# Patient Record
Sex: FEMALE
[De-identification: names, ages, dates, MRNs, and addresses within clinical notes are randomized per-mention and may not be internally consistent; named-entity substitution may affect disease eponyms.]

---

## 2019-04-18 ENCOUNTER — TRANSCRIPTION ENCOUNTER (OUTPATIENT)
Age: 84
End: 2019-04-18

## 2019-12-03 ENCOUNTER — APPOINTMENT (OUTPATIENT)
Dept: ORTHOPEDIC SURGERY | Facility: CLINIC | Age: 84
End: 2019-12-03
Payer: MEDICARE

## 2019-12-03 VITALS — BODY MASS INDEX: 21.79 KG/M2 | WEIGHT: 123 LBS | HEIGHT: 63 IN

## 2019-12-03 PROBLEM — Z00.00 ENCOUNTER FOR PREVENTIVE HEALTH EXAMINATION: Status: ACTIVE | Noted: 2019-12-03

## 2019-12-03 PROCEDURE — 73562 X-RAY EXAM OF KNEE 3: CPT | Mod: 50

## 2019-12-03 PROCEDURE — 20611 DRAIN/INJ JOINT/BURSA W/US: CPT | Mod: RT

## 2019-12-03 PROCEDURE — 99204 OFFICE O/P NEW MOD 45 MIN: CPT | Mod: 25

## 2019-12-03 NOTE — PHYSICAL EXAM
[de-identified] : Right knee exam today patient has range of motion of 0-125°. 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 Tibiofemoral articulation is fairly well maintained

## 2019-12-03 NOTE — DISCUSSION/SUMMARY
[de-identified] : Patient will followup in 1-2 weeks now improved repeat evaluation possible second injection.Patient would like to avoid into for on any anti-inflammatory medication.

## 2019-12-03 NOTE — HISTORY OF PRESENT ILLNESS
[de-identified] : PAIN STARTED 2-3 WEEKS AGO - REPORTS NO FALLS OR ACCIDENTS Patient claims the right knee began to become painful she recalls no specific accident or injury. It is painful going up stairs not so much down stairs and getting out of a seated position and the pain is mostly anterior.\par \par SENT FOR NEW XRAYS TODAY

## 2019-12-03 NOTE — PROCEDURE
[de-identified] : Patient was given a cortisone injection to the lateral compartment of the right knee today. This was done and sterile conditions and ultrasound guidance. Patient tolerated the procedure well.

## 2020-07-23 ENCOUNTER — TRANSCRIPTION ENCOUNTER (OUTPATIENT)
Age: 85
End: 2020-07-23

## 2020-12-22 ENCOUNTER — TRANSCRIPTION ENCOUNTER (OUTPATIENT)
Age: 85
End: 2020-12-22

## 2021-03-24 ENCOUNTER — TRANSCRIPTION ENCOUNTER (OUTPATIENT)
Age: 86
End: 2021-03-24

## 2021-10-28 ENCOUNTER — TRANSCRIPTION ENCOUNTER (OUTPATIENT)
Age: 86
End: 2021-10-28

## 2024-12-30 ENCOUNTER — EMERGENCY (EMERGENCY)
Facility: HOSPITAL | Age: 89
LOS: 1 days | Discharge: HOME | End: 2024-12-30
Attending: STUDENT IN AN ORGANIZED HEALTH CARE EDUCATION/TRAINING PROGRAM
Payer: MEDICARE

## 2024-12-30 ENCOUNTER — NON-APPOINTMENT (OUTPATIENT)
Age: 89
End: 2024-12-30

## 2024-12-30 VITALS
TEMPERATURE: 98 F | RESPIRATION RATE: 18 BRPM | WEIGHT: 119.93 LBS | OXYGEN SATURATION: 97 % | DIASTOLIC BLOOD PRESSURE: 82 MMHG | HEIGHT: 64 IN | HEART RATE: 70 BPM | SYSTOLIC BLOOD PRESSURE: 149 MMHG

## 2024-12-30 LAB
ALBUMIN SERPL ELPH-MCNC: 3.8 G/DL — SIGNIFICANT CHANGE UP (ref 3.3–5)
ALP SERPL-CCNC: 72 U/L — SIGNIFICANT CHANGE UP (ref 40–120)
ALT FLD-CCNC: 42 U/L — SIGNIFICANT CHANGE UP (ref 10–45)
ANION GAP SERPL CALC-SCNC: 11 MMOL/L — SIGNIFICANT CHANGE UP (ref 5–17)
APPEARANCE UR: CLEAR — SIGNIFICANT CHANGE UP
APTT BLD: 29.5 SEC — SIGNIFICANT CHANGE UP (ref 24.5–35.6)
AST SERPL-CCNC: 33 U/L — SIGNIFICANT CHANGE UP (ref 10–40)
BACTERIA # UR AUTO: NEGATIVE /HPF — SIGNIFICANT CHANGE UP
BASOPHILS # BLD AUTO: 0.07 K/UL — SIGNIFICANT CHANGE UP (ref 0–0.2)
BASOPHILS NFR BLD AUTO: 1 % — SIGNIFICANT CHANGE UP (ref 0–2)
BILIRUB SERPL-MCNC: 0.2 MG/DL — SIGNIFICANT CHANGE UP (ref 0.2–1.2)
BILIRUB UR-MCNC: NEGATIVE — SIGNIFICANT CHANGE UP
BUN SERPL-MCNC: 22 MG/DL — SIGNIFICANT CHANGE UP (ref 7–23)
CALCIUM SERPL-MCNC: 9.2 MG/DL — SIGNIFICANT CHANGE UP (ref 8.4–10.5)
CAST: 0 /LPF — SIGNIFICANT CHANGE UP (ref 0–4)
CHLORIDE SERPL-SCNC: 108 MMOL/L — SIGNIFICANT CHANGE UP (ref 96–108)
CO2 SERPL-SCNC: 22 MMOL/L — SIGNIFICANT CHANGE UP (ref 22–31)
COLOR SPEC: YELLOW — SIGNIFICANT CHANGE UP
CREAT SERPL-MCNC: 0.81 MG/DL — SIGNIFICANT CHANGE UP (ref 0.5–1.3)
DIFF PNL FLD: NEGATIVE — SIGNIFICANT CHANGE UP
EGFR: 70 ML/MIN/1.73M2 — SIGNIFICANT CHANGE UP
EOSINOPHIL # BLD AUTO: 0.12 K/UL — SIGNIFICANT CHANGE UP (ref 0–0.5)
EOSINOPHIL NFR BLD AUTO: 1.7 % — SIGNIFICANT CHANGE UP (ref 0–6)
FLUAV AG NPH QL: SIGNIFICANT CHANGE UP
FLUBV AG NPH QL: SIGNIFICANT CHANGE UP
GLUCOSE SERPL-MCNC: 88 MG/DL — SIGNIFICANT CHANGE UP (ref 70–99)
GLUCOSE UR QL: NEGATIVE MG/DL — SIGNIFICANT CHANGE UP
HCT VFR BLD CALC: 38 % — SIGNIFICANT CHANGE UP (ref 34.5–45)
HGB BLD-MCNC: 12.2 G/DL — SIGNIFICANT CHANGE UP (ref 11.5–15.5)
IMM GRANULOCYTES NFR BLD AUTO: 0.3 % — SIGNIFICANT CHANGE UP (ref 0–0.9)
INR BLD: 1.01 RATIO — SIGNIFICANT CHANGE UP (ref 0.85–1.16)
KETONES UR-MCNC: NEGATIVE MG/DL — SIGNIFICANT CHANGE UP
LEUKOCYTE ESTERASE UR-ACNC: ABNORMAL
LYMPHOCYTES # BLD AUTO: 1.46 K/UL — SIGNIFICANT CHANGE UP (ref 1–3.3)
LYMPHOCYTES # BLD AUTO: 20.7 % — SIGNIFICANT CHANGE UP (ref 13–44)
MCHC RBC-ENTMCNC: 30.3 PG — SIGNIFICANT CHANGE UP (ref 27–34)
MCHC RBC-ENTMCNC: 32.1 G/DL — SIGNIFICANT CHANGE UP (ref 32–36)
MCV RBC AUTO: 94.3 FL — SIGNIFICANT CHANGE UP (ref 80–100)
MONOCYTES # BLD AUTO: 0.55 K/UL — SIGNIFICANT CHANGE UP (ref 0–0.9)
MONOCYTES NFR BLD AUTO: 7.8 % — SIGNIFICANT CHANGE UP (ref 2–14)
NEUTROPHILS # BLD AUTO: 4.84 K/UL — SIGNIFICANT CHANGE UP (ref 1.8–7.4)
NEUTROPHILS NFR BLD AUTO: 68.5 % — SIGNIFICANT CHANGE UP (ref 43–77)
NITRITE UR-MCNC: NEGATIVE — SIGNIFICANT CHANGE UP
NRBC # BLD: 0 /100 WBCS — SIGNIFICANT CHANGE UP (ref 0–0)
PH UR: 8 — SIGNIFICANT CHANGE UP (ref 5–8)
PLATELET # BLD AUTO: 236 K/UL — SIGNIFICANT CHANGE UP (ref 150–400)
POTASSIUM SERPL-MCNC: 4.3 MMOL/L — SIGNIFICANT CHANGE UP (ref 3.5–5.3)
POTASSIUM SERPL-SCNC: 4.3 MMOL/L — SIGNIFICANT CHANGE UP (ref 3.5–5.3)
PROT SERPL-MCNC: 7.3 G/DL — SIGNIFICANT CHANGE UP (ref 6–8.3)
PROT UR-MCNC: NEGATIVE MG/DL — SIGNIFICANT CHANGE UP
PROTHROM AB SERPL-ACNC: 11.5 SEC — SIGNIFICANT CHANGE UP (ref 9.9–13.4)
RBC # BLD: 4.03 M/UL — SIGNIFICANT CHANGE UP (ref 3.8–5.2)
RBC # FLD: 13.5 % — SIGNIFICANT CHANGE UP (ref 10.3–14.5)
RBC CASTS # UR COMP ASSIST: 1 /HPF — SIGNIFICANT CHANGE UP (ref 0–4)
RSV RNA NPH QL NAA+NON-PROBE: SIGNIFICANT CHANGE UP
SARS-COV-2 RNA SPEC QL NAA+PROBE: SIGNIFICANT CHANGE UP
SODIUM SERPL-SCNC: 141 MMOL/L — SIGNIFICANT CHANGE UP (ref 135–145)
SP GR SPEC: 1.02 — SIGNIFICANT CHANGE UP (ref 1–1.03)
SQUAMOUS # UR AUTO: 1 /HPF — SIGNIFICANT CHANGE UP (ref 0–5)
TROPONIN T, HIGH SENSITIVITY RESULT: 11 NG/L — SIGNIFICANT CHANGE UP (ref 0–51)
UROBILINOGEN FLD QL: 0.2 MG/DL — SIGNIFICANT CHANGE UP (ref 0.2–1)
WBC # BLD: 7.06 K/UL — SIGNIFICANT CHANGE UP (ref 3.8–10.5)
WBC # FLD AUTO: 7.06 K/UL — SIGNIFICANT CHANGE UP (ref 3.8–10.5)
WBC UR QL: 4 /HPF — SIGNIFICANT CHANGE UP (ref 0–5)

## 2024-12-30 PROCEDURE — 87637 SARSCOV2&INF A&B&RSV AMP PRB: CPT

## 2024-12-30 PROCEDURE — 93005 ELECTROCARDIOGRAM TRACING: CPT

## 2024-12-30 PROCEDURE — 85610 PROTHROMBIN TIME: CPT

## 2024-12-30 PROCEDURE — 70498 CT ANGIOGRAPHY NECK: CPT | Mod: MC

## 2024-12-30 PROCEDURE — 84484 ASSAY OF TROPONIN QUANT: CPT

## 2024-12-30 PROCEDURE — 85025 COMPLETE CBC W/AUTO DIFF WBC: CPT

## 2024-12-30 PROCEDURE — 99285 EMERGENCY DEPT VISIT HI MDM: CPT | Mod: FS,GC

## 2024-12-30 PROCEDURE — 36000 PLACE NEEDLE IN VEIN: CPT | Mod: XU

## 2024-12-30 PROCEDURE — 93010 ELECTROCARDIOGRAM REPORT: CPT

## 2024-12-30 PROCEDURE — 99285 EMERGENCY DEPT VISIT HI MDM: CPT | Mod: 25

## 2024-12-30 PROCEDURE — 70450 CT HEAD/BRAIN W/O DYE: CPT | Mod: 26,MC,XU

## 2024-12-30 PROCEDURE — 70496 CT ANGIOGRAPHY HEAD: CPT | Mod: MC

## 2024-12-30 PROCEDURE — 70496 CT ANGIOGRAPHY HEAD: CPT | Mod: 26,MC

## 2024-12-30 PROCEDURE — 70450 CT HEAD/BRAIN W/O DYE: CPT | Mod: MC

## 2024-12-30 PROCEDURE — 85730 THROMBOPLASTIN TIME PARTIAL: CPT

## 2024-12-30 PROCEDURE — 81001 URINALYSIS AUTO W/SCOPE: CPT

## 2024-12-30 PROCEDURE — 80053 COMPREHEN METABOLIC PANEL: CPT

## 2024-12-30 PROCEDURE — 87086 URINE CULTURE/COLONY COUNT: CPT

## 2024-12-30 PROCEDURE — 70498 CT ANGIOGRAPHY NECK: CPT | Mod: 26,MC

## 2024-12-30 RX ORDER — MECLIZINE HCL 12.5 MG
25 TABLET ORAL ONCE
Refills: 0 | Status: COMPLETED | OUTPATIENT
Start: 2024-12-30 | End: 2024-12-30

## 2024-12-30 RX ADMIN — Medication 25 MILLIGRAM(S): at 22:50

## 2024-12-30 NOTE — ED PROVIDER NOTE - NSFOLLOWUPINSTRUCTIONS_ED_ALL_ED_FT
You were seen in the emergency department for dizziness you received a CAT scan which was negative for any intracranial bleeding or signs of stroke.  Your symptoms are likely vertigo, we would like for you to see an ENT for vestibular therapy, and a neurologist for your dizziness.  Please take Meclizine for your dizziness at most four times per day  Please take zofran at most 3 times per day for dizziness at least 6 hours    Vertigo  The outer and inner ear showing the eardrum and ear canal.  Vertigo is the feeling that you or the things around you are moving or spinning when they're not. It's different than feeling dizzy. It can also cause:  Loss of balance.  Trouble standing or walking.  Nausea and vomiting.  This feeling can come and go at any time. It can last from a few seconds to minutes or even hours. It may go away on its own or be treated with medicine.    What are the types of vertigo?  There are two types of vertigo:  Peripheral vertigo happens when parts of your inner ear don't work like they should. This is the more common type.  Central vertigo happens when your brain and spinal cord don't work like they should.  Your health care provider will do tests to find out what kind of vertigo you have. This will help them decide on the right treatment for you.    Follow these instructions at home:  Eating and drinking    Drink enough fluid to keep your pee (urine) pale yellow.  Do not drink alcohol.  Activity    When you get up in the morning, first sit up on the side of the bed. When you feel okay, stand slowly while holding onto something.  Move slowly. Avoid sudden body or head movements.  Avoid certain positions, as told by your provider.  Use a cane if you have trouble standing or walking.  Sit down right away if you feel unsteady.  Place items in your home so they're easy for you to reach without bending or leaning over.  Return to normal activities when you're told. Ask what things are safe for you to do.  General instructions    Take your medicines only as told by your provider.  Contact a health care provider if:  Your medicines don't help or make your vertigo worse.  You get new symptoms.  You have a fever.  You have nausea or vomiting.  Your family or friends spot any changes in how you're acting.  A part of your body goes numb.  You feel tingling and prickling in a part of your body.  You get very bad headaches.  Get help right away if:  You're always dizzy or you faint.  You have a stiff neck.  You have trouble moving or speaking.  Your hands, arms, or legs feel weak.  Your hearing or eyesight changes.  These symptoms may be an emergency. Call 911 right away.  Do not wait to see if the symptoms will go away.  Do not drive yourself to the hospital.  This information is not intended to replace advice given to you by your health care provider. Make sure you discuss any questions you have with your health care provider.

## 2024-12-30 NOTE — ED PROVIDER NOTE - ATTENDING CONTRIBUTION TO CARE
Attending Goldy: I performed a history and physical exam of the patient and discussed their management with the resident/fellow/student. I have reviewed the resident/fellow/student note and agree with the documented findings and plan of care, except as noted. I have personally performed a substantive portion of the visit including all aspects of the medical decision making. My medical decision making and observations are found below. Please refer to any progress notes for updates on clinical course. My notes supersedes the above resident/fellow/student note in case of discrepancy    MDM:  88 y/o F w/ PMH of HTN presenting w/ dizziness. Seen w/ daughter. Reports having intermittent dizziness for the past few months. Described as an off balance sensation when walking. Symptoms would usually resolve after a brief time, however for the past day has had persistent dizziness. Worse w/ change in position. Has not been evaluated for this previously as the symptoms always resolved. Denies any falls or head injury. Denies fevers, chills, headache, blurred vision, chest pain, cough, shortness of breath, abdominal pain, n/v/d/c, urinary symptoms, MSK pain, rash.     Gen: NAD, AOx3, able to make needs known, non-toxic  Head: NCAT  HEENT: EOMI, oral mucosa moist, normal conjunctiva. no nystagmus  Lung: no respiratory distress, CTAB, no wheezes/rhonchi/rales B/L, speaking in full sentences  CV: RRR, no murmurs  Abd: non distended, soft, nontender, no guarding, no CVA tenderness  MSK: no visible deformities  Neuro: Appears non focal. steady gait  Skin: Warm, well perfused  Psych: normal affect     Pt overall well appearing, no acute distress. Suspect likely peripheral vertigo however given age and symptoms will eval for central vertigo causes such as CVA or mass. Will eval for metabolic abnormalities. Eval for infectious abnormalities. Doubtful cardiac cause. Will treat symptomatically. Plan for labs, imaging, meds, EKG. Will reassess the need for additional interventions as clinically warranted. Refer to any progress notes for updates on clinical course and as a continuation of this MDM.     I, Dr. Chaz Winslow, independently interpreted the EKG which showed NSR at a rate of 69.

## 2024-12-30 NOTE — ED PROVIDER NOTE - RAPID ASSESSMENT
89 F No endorsed PMH presents to the ED complaining of dizziness and sense of feeling off balance since yesterday last known normal is 12/29/2024 at approximately 8 AM.  Patient reported feeling the symptoms upon awakening.  Patient has difficulty describing the dizziness reports it is neither room spinning self spinning or lightheadedness but reports feeling some sensation of movement especially when bending over.  Patient reports poor p.o. fluid intake.  Patient denies infectious symptoms including fevers, chills, cough, URI symptoms, urinary complaints, abdominal pain.  Patient denies chest pain, palpitations, shortness of breath, pain of any type.  Patient denies visual change, numbness, paresthesias, weakness, headaches.  Patient denies head trauma.  Patient denies vomiting, diarrhea, melena, hematochezia.    Patient was seen as a rapid assessment (QPA) patient. This is an incomplete workup and it is intended that the patient will be seen in the main emergency department. The patient will be seen and further worked up in the main emergency department and their care will be completed by the main emergency department team along with a thorough physical exam. Receiving team will follow up on labs, analgesia, any clinical imaging, reassess and disposition as clinically indicated, all decisions regarding the progression of care will be made at their discretion. - Job Callejas PA-C

## 2024-12-30 NOTE — ED PROVIDER NOTE - PHYSICAL EXAMINATION
GENERAL: NAD, lying in bed comfortably  HEAD:  Atraumatic, normocephalic  EYES: EOMI, PERRLA, conjunctiva and sclera clear  NECK: Supple, trachea midline, no JVD  HEART: Regular rate and rhythm, no murmurs, rubs, or gallops  LUNGS: Unlabored respirations.  Clear to auscultation bilaterally, no crackles, wheezing, or rhonchi  ABDOMEN: Soft, nontender, nondistended, +BS  EXTREMITIES: 2+ peripheral pulses bilaterally. No clubbing, cyanosis, or edema  NERVOUS SYSTEM:  A&Ox3, moving all extremities, no focal deficits , CN intact, no gait ataxia, coordination intact, no pronator drift  SKIN: No rashes or lesions

## 2024-12-30 NOTE — ED ADULT NURSE NOTE - NSFALLHARMRISKINTERV_ED_ALL_ED
Assistance OOB with selected safe patient handling equipment if applicable/Assistance with ambulation/Communicate risk of Fall with Harm to all staff, patient, and family/Encourage patient to sit up slowly, dangle for a short time, stand at bedside before walking/Monitor gait and stability/Orthostatic vital signs/Provide visual cue: red socks, yellow wristband, yellow gown, etc/Reinforce activity limits and safety measures with patient and family/Bed in lowest position, wheels locked, appropriate side rails in place/Call bell, personal items and telephone in reach/Instruct patient to call for assistance before getting out of bed/chair/stretcher/Non-slip footwear applied when patient is off stretcher/[place] to call system/Physically safe environment - no spills, clutter or unnecessary equipment/Purposeful Proactive Rounding/Room/bathroom lighting operational, light cord in reach

## 2024-12-30 NOTE — ED PROVIDER NOTE - PATIENT PORTAL LINK FT
You can access the FollowMyHealth Patient Portal offered by [facility] by registering at the following website: [email or web address]. By joining [employer]’s FollowMyHealth portal, you will also be able to view your health information using other applications (apps) compatible with our system.
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
## 2024-12-30 NOTE — ED PROVIDER NOTE - OBJECTIVE STATEMENT
89-year-old female hard of hearing, hypertension presenting with dizziness  Patient states having on and off dizziness for the past few months typically in the morning.  Patient was concerned when yesterday in the morning symptoms did not subside in the morning.  Patient states dizziness is worse when turning her head bending forward or walking.  Patient states dizziness is like room spinning.  Patient denies any URI, dysuria, abdominal pain, fevers, chills, focal neurological deficits.

## 2024-12-30 NOTE — ED ADULT NURSE NOTE - OBJECTIVE STATEMENT
Pt is 89y F with PMH HTN on amlodipine, complaining of dizziness. Pt reports onset of dizziness and off-balance sensation since 0800 yesterday. Denies room spinning sensation or self-spinning sensation, but reports "feels like I'm moving" bending over. Also reports recent poor PO intake. Denies chest pain, SOB, abdominal pain, n/v, headaches, changes in vision, facial droop. PERRLA 3mm. HTN, all other vitals WNL.

## 2024-12-30 NOTE — ED ADULT TRIAGE NOTE - CHIEF COMPLAINT QUOTE
Patient c/o lightheadedness and off balance since yesterday morning. Sent from urgent care. Denies chest pain, headache.

## 2024-12-30 NOTE — ED PROVIDER NOTE - CLINICAL SUMMARY MEDICAL DECISION MAKING FREE TEXT BOX
Patient concerning for central versus peripheral vertigo, CVA will obtain CTA head and neck, basic labs, UA UC, flu COVID RSV, basic labs Patient concerning for central versus peripheral vertigo, CVA will obtain CTA head and neck, basic labs, UA UC, flu COVID RSV, basic labs    Attending Goldy: See attending attestation.

## 2024-12-31 VITALS
HEART RATE: 61 BPM | DIASTOLIC BLOOD PRESSURE: 75 MMHG | SYSTOLIC BLOOD PRESSURE: 154 MMHG | RESPIRATION RATE: 16 BRPM | TEMPERATURE: 98 F | OXYGEN SATURATION: 98 %

## 2024-12-31 RX ORDER — MECLIZINE HCL 12.5 MG
1 TABLET ORAL
Qty: 56 | Refills: 0
Start: 2024-12-31 | End: 2025-01-13

## 2024-12-31 RX ORDER — ONDANSETRON HYDROCHLORIDE 4 MG/1
1 TABLET, FILM COATED ORAL
Qty: 3 | Refills: 0
Start: 2024-12-31 | End: 2025-01-13

## 2025-01-01 LAB
CULTURE RESULTS: SIGNIFICANT CHANGE UP
SPECIMEN SOURCE: SIGNIFICANT CHANGE UP

## 2025-05-28 PROBLEM — I10 ESSENTIAL (PRIMARY) HYPERTENSION: Chronic | Status: ACTIVE | Noted: 2024-12-30

## 2025-06-05 ENCOUNTER — RESULT CHARGE (OUTPATIENT)
Age: 89
End: 2025-06-05

## 2025-06-05 ENCOUNTER — APPOINTMENT (OUTPATIENT)
Dept: ORTHOPEDIC SURGERY | Facility: CLINIC | Age: 89
End: 2025-06-05
Payer: MEDICARE

## 2025-06-05 DIAGNOSIS — M75.52 BURSITIS OF LEFT SHOULDER: ICD-10-CM

## 2025-06-05 PROBLEM — I10 HYPERTENSION: Status: RESOLVED | Noted: 2025-06-05 | Resolved: 2025-06-05

## 2025-06-05 PROCEDURE — 73030 X-RAY EXAM OF SHOULDER: CPT | Mod: LT

## 2025-06-05 PROCEDURE — 99203 OFFICE O/P NEW LOW 30 MIN: CPT | Mod: 25

## 2025-06-05 PROCEDURE — 20610 DRAIN/INJ JOINT/BURSA W/O US: CPT | Mod: LT

## 2025-06-11 ENCOUNTER — APPOINTMENT (OUTPATIENT)
Dept: GERIATRICS | Facility: CLINIC | Age: 89
End: 2025-06-11
Payer: MEDICARE

## 2025-06-11 VITALS
BODY MASS INDEX: 21.26 KG/M2 | DIASTOLIC BLOOD PRESSURE: 73 MMHG | HEIGHT: 63 IN | SYSTOLIC BLOOD PRESSURE: 138 MMHG | HEART RATE: 73 BPM | RESPIRATION RATE: 13 BRPM | TEMPERATURE: 98.2 F | WEIGHT: 120 LBS | OXYGEN SATURATION: 98 %

## 2025-06-11 PROCEDURE — G2211 COMPLEX E/M VISIT ADD ON: CPT

## 2025-06-11 PROCEDURE — 99205 OFFICE O/P NEW HI 60 MIN: CPT

## 2025-06-11 PROCEDURE — 99497 ADVNCD CARE PLAN 30 MIN: CPT

## 2025-06-11 PROCEDURE — G0444 DEPRESSION SCREEN ANNUAL: CPT

## 2025-06-11 RX ORDER — AMLODIPINE BESYLATE 10 MG/1
10 TABLET ORAL DAILY
Qty: 90 | Refills: 1 | Status: ACTIVE | COMMUNITY
Start: 1900-01-01 | End: 1900-01-01

## 2025-06-11 RX ORDER — MIRTAZAPINE 7.5 MG/1
7.5 TABLET, FILM COATED ORAL
Qty: 30 | Refills: 1 | Status: ACTIVE | COMMUNITY
Start: 2025-06-11 | End: 1900-01-01

## 2025-06-13 ENCOUNTER — APPOINTMENT (OUTPATIENT)
Dept: GERIATRICS | Facility: CLINIC | Age: 89
End: 2025-06-13

## 2025-06-15 ENCOUNTER — OUTPATIENT (OUTPATIENT)
Dept: OUTPATIENT SERVICES | Facility: HOSPITAL | Age: 89
LOS: 1 days | End: 2025-06-15
Payer: MEDICARE

## 2025-06-15 DIAGNOSIS — R42 DIZZINESS AND GIDDINESS: ICD-10-CM

## 2025-06-15 PROCEDURE — A9585: CPT

## 2025-06-15 PROCEDURE — 70553 MRI BRAIN STEM W/O & W/DYE: CPT

## 2025-06-18 PROBLEM — M85.80 OSTEOPENIA: Status: ACTIVE | Noted: 2025-06-11

## 2025-06-18 PROBLEM — Z23 ENCOUNTER FOR IMMUNIZATION: Status: ACTIVE | Noted: 2025-06-18 | Resolved: 2025-07-02

## 2025-06-18 PROBLEM — M21.619 BUNION: Status: RESOLVED | Noted: 2025-06-18 | Resolved: 2025-06-18

## 2025-06-18 PROBLEM — Z81.8 FAMILY HISTORY OF SUICIDE: Status: ACTIVE | Noted: 2025-06-18

## 2025-06-18 PROBLEM — Z71.89 ADVANCED CARE PLANNING/COUNSELING DISCUSSION: Status: ACTIVE | Noted: 2025-06-18

## 2025-06-18 LAB
25(OH)D3 SERPL-MCNC: 50.6 NG/ML
ALBUMIN SERPL ELPH-MCNC: 4.1 G/DL
ALP BLD-CCNC: 69 U/L
ALT SERPL-CCNC: 39 U/L
ANION GAP SERPL CALC-SCNC: 14 MMOL/L
AST SERPL-CCNC: 22 U/L
BASOPHILS # BLD AUTO: 0.05 K/UL
BASOPHILS NFR BLD AUTO: 0.7 %
BILIRUB SERPL-MCNC: 0.3 MG/DL
BUN SERPL-MCNC: 23 MG/DL
CALCIUM SERPL-MCNC: 9.3 MG/DL
CHLORIDE SERPL-SCNC: 106 MMOL/L
CHOLEST SERPL-MCNC: 218 MG/DL
CO2 SERPL-SCNC: 24 MMOL/L
CREAT SERPL-MCNC: 0.75 MG/DL
CREAT SPEC-SCNC: 55 MG/DL
CREAT/PROT UR: 0.2 RATIO
EGFRCR SERPLBLD CKD-EPI 2021: 77 ML/MIN/1.73M2
EOSINOPHIL # BLD AUTO: 0.13 K/UL
EOSINOPHIL NFR BLD AUTO: 1.7 %
GLUCOSE SERPL-MCNC: 84 MG/DL
HCT VFR BLD CALC: 38.4 %
HDLC SERPL-MCNC: 79 MG/DL
HGB BLD-MCNC: 12.1 G/DL
IMM GRANULOCYTES NFR BLD AUTO: 0.4 %
LDLC SERPL-MCNC: 130 MG/DL
LYMPHOCYTES # BLD AUTO: 1.79 K/UL
LYMPHOCYTES NFR BLD AUTO: 24 %
MAN DIFF?: NORMAL
MCHC RBC-ENTMCNC: 30.1 PG
MCHC RBC-ENTMCNC: 31.5 G/DL
MCV RBC AUTO: 95.5 FL
MONOCYTES # BLD AUTO: 0.73 K/UL
MONOCYTES NFR BLD AUTO: 9.8 %
NEUTROPHILS # BLD AUTO: 4.72 K/UL
NEUTROPHILS NFR BLD AUTO: 63.4 %
NONHDLC SERPL-MCNC: 139 MG/DL
PLATELET # BLD AUTO: 284 K/UL
POTASSIUM SERPL-SCNC: 4.4 MMOL/L
PROT SERPL-MCNC: 7.6 G/DL
PROT UR-MCNC: 10 MG/DL
RBC # BLD: 4.02 M/UL
RBC # FLD: 13.5 %
SODIUM SERPL-SCNC: 144 MMOL/L
TRIGL SERPL-MCNC: 53 MG/DL
TSH SERPL-ACNC: 2.29 UIU/ML
WBC # FLD AUTO: 7.45 K/UL

## 2025-06-18 RX ORDER — ROSUVASTATIN CALCIUM 5 MG/1
5 TABLET, FILM COATED ORAL
Qty: 90 | Refills: 1 | Status: ACTIVE | COMMUNITY
Start: 2025-06-18 | End: 1900-01-01

## 2025-06-19 ENCOUNTER — TRANSCRIPTION ENCOUNTER (OUTPATIENT)
Age: 89
End: 2025-06-19

## 2025-06-20 ENCOUNTER — TRANSCRIPTION ENCOUNTER (OUTPATIENT)
Age: 89
End: 2025-06-20

## 2025-06-25 ENCOUNTER — NON-APPOINTMENT (OUTPATIENT)
Age: 89
End: 2025-06-25

## 2025-06-26 ENCOUNTER — APPOINTMENT (OUTPATIENT)
Age: 89
End: 2025-06-26

## 2025-06-26 PROCEDURE — G2211 COMPLEX E/M VISIT ADD ON: CPT | Mod: 2W

## 2025-06-26 PROCEDURE — 99214 OFFICE O/P EST MOD 30 MIN: CPT | Mod: 2W

## 2025-07-07 ENCOUNTER — TRANSCRIPTION ENCOUNTER (OUTPATIENT)
Age: 89
End: 2025-07-07

## 2025-07-15 PROBLEM — F33.1 MODERATE EPISODE OF RECURRENT MAJOR DEPRESSIVE DISORDER: Status: ACTIVE | Noted: 2025-06-11

## 2025-07-23 ENCOUNTER — APPOINTMENT (OUTPATIENT)
Dept: GERIATRICS | Facility: CLINIC | Age: 89
End: 2025-07-23
Payer: MEDICARE

## 2025-07-23 DIAGNOSIS — E78.5 HYPERLIPIDEMIA, UNSPECIFIED: ICD-10-CM

## 2025-07-23 DIAGNOSIS — R42 DIZZINESS AND GIDDINESS: ICD-10-CM

## 2025-07-23 DIAGNOSIS — I10 ESSENTIAL (PRIMARY) HYPERTENSION: ICD-10-CM

## 2025-07-23 PROCEDURE — G2211 COMPLEX E/M VISIT ADD ON: CPT | Mod: 2W

## 2025-07-23 PROCEDURE — 99214 OFFICE O/P EST MOD 30 MIN: CPT | Mod: 2W
